# Patient Record
Sex: FEMALE | Race: ASIAN | Employment: UNEMPLOYED | URBAN - METROPOLITAN AREA
[De-identification: names, ages, dates, MRNs, and addresses within clinical notes are randomized per-mention and may not be internally consistent; named-entity substitution may affect disease eponyms.]

---

## 2024-04-25 ENCOUNTER — HOSPITAL ENCOUNTER (EMERGENCY)
Age: 49
Discharge: PSYCHIATRIC OR SUBSTANCE USE FACILITY | End: 2024-04-25
Attending: EMERGENCY MEDICINE
Payer: COMMERCIAL

## 2024-04-25 ENCOUNTER — HOSPITAL ENCOUNTER (INPATIENT)
Age: 49
LOS: 4 days | Discharge: HOME | DRG: 751 | End: 2024-04-29
Attending: PSYCHIATRY & NEUROLOGY | Admitting: PSYCHIATRY & NEUROLOGY
Payer: COMMERCIAL

## 2024-04-25 VITALS
RESPIRATION RATE: 18 BRPM | DIASTOLIC BLOOD PRESSURE: 63 MMHG | OXYGEN SATURATION: 99 % | HEART RATE: 73 BPM | WEIGHT: 131.2 LBS | HEIGHT: 61 IN | TEMPERATURE: 97.5 F | SYSTOLIC BLOOD PRESSURE: 104 MMHG | BODY MASS INDEX: 24.77 KG/M2

## 2024-04-25 DIAGNOSIS — F23 ACUTE PSYCHOSIS (HCC): Primary | ICD-10-CM

## 2024-04-25 PROBLEM — F29 PSYCHOSIS, UNSPECIFIED PSYCHOSIS TYPE (HCC): Status: ACTIVE | Noted: 2024-04-25

## 2024-04-25 LAB
ALBUMIN SERPL-MCNC: 4.5 G/DL (ref 3.4–5)
ALBUMIN/GLOB SERPL: 2 {RATIO}
ALP SERPL-CCNC: 75 U/L (ref 40–129)
ALT SERPL-CCNC: <5 U/L (ref 10–40)
AMPHET UR QL SCN: NEGATIVE
ANION GAP SERPL CALCULATED.3IONS-SCNC: 12 MMOL/L (ref 3–16)
APAP SERPL-MCNC: <5 UG/ML (ref 10–30)
AST SERPL-CCNC: 15 U/L (ref 15–37)
BACTERIA URNS QL MICRO: ABNORMAL
BARBITURATES UR QL SCN: NEGATIVE
BASOPHILS # BLD: 0.04 K/UL (ref 0–0.2)
BASOPHILS NFR BLD: 1 %
BENZODIAZ UR QL: NEGATIVE
BILIRUB SERPL-MCNC: 0.7 MG/DL (ref 0–1)
BILIRUB UR QL STRIP: ABNORMAL
BUN SERPL-MCNC: 14 MG/DL (ref 7–20)
CALCIUM SERPL-MCNC: 9.2 MG/DL (ref 8.3–10.6)
CANNABINOIDS UR QL SCN: NEGATIVE
CHARACTER UR: ABNORMAL
CHLORIDE SERPL-SCNC: 105 MMOL/L (ref 99–110)
CLARITY UR: CLEAR
CO2 SERPL-SCNC: 23 MMOL/L (ref 21–32)
COCAINE UR QL SCN: NEGATIVE
COLOR UR: YELLOW
CREAT SERPL-MCNC: 0.6 MG/DL (ref 0.5–1)
EOSINOPHIL # BLD: 0.02 K/UL (ref 0–0.6)
EOSINOPHILS RELATIVE PERCENT: 0 %
EPI CELLS #/AREA URNS HPF: ABNORMAL /HPF
ERYTHROCYTE [DISTWIDTH] IN BLOOD BY AUTOMATED COUNT: 12.4 % (ref 12.4–15.4)
ETHANOLAMINE SERPL-MCNC: NORMAL MG/DL
FENTANYL UR QL: NEGATIVE
FLUAV AG SPEC QL: NEGATIVE
FLUBV AG SPEC QL: NEGATIVE
GFR SERPL CREATININE-BSD FRML MDRD: >90 ML/MIN/1.73M2
GLUCOSE SERPL-MCNC: 129 MG/DL (ref 70–99)
GLUCOSE UR STRIP-MCNC: NEGATIVE MG/DL
HCG UR QL: NEGATIVE
HCT VFR BLD AUTO: 39.1 % (ref 36–48)
HGB BLD-MCNC: 13 G/DL (ref 12–16)
HGB UR QL STRIP.AUTO: NEGATIVE
IMM GRANULOCYTES # BLD AUTO: 0.01 K/UL (ref 0–0.5)
IMM GRANULOCYTES NFR BLD: 0 %
KETONES UR STRIP-MCNC: 40 MG/DL
LEUKOCYTE ESTERASE UR QL STRIP: ABNORMAL
LYMPHOCYTES NFR BLD: 0.83 K/UL (ref 1–5.1)
LYMPHOCYTES RELATIVE PERCENT: 13 %
MCH RBC QN AUTO: 29.7 PG (ref 26–34)
MCHC RBC AUTO-ENTMCNC: 33.2 G/DL (ref 31–36)
MCV RBC AUTO: 89.3 FL (ref 80–100)
METHADONE UR QL: NEGATIVE
MONOCYTES NFR BLD: 0.3 K/UL (ref 0–1.3)
MONOCYTES NFR BLD: 5 %
MUCOUS THREADS URNS QL MICRO: PRESENT
NEUTROPHILS NFR BLD: 81 %
NEUTS SEG NFR BLD: 5.07 K/UL (ref 1.7–7.7)
NITRITE UR QL STRIP: NEGATIVE
OPIATES UR QL SCN: NEGATIVE
OXYCODONE UR QL SCN: NEGATIVE
PCP UR QL SCN: NEGATIVE
PH UR STRIP: 5 [PH]
PH UR STRIP: 6 [PH] (ref 5–8)
PLATELET # BLD AUTO: 287 K/UL (ref 135–450)
PMV BLD AUTO: 10.8 FL (ref 9.4–12.4)
POTASSIUM SERPL-SCNC: 3.9 MMOL/L (ref 3.5–5.1)
PROT SERPL-MCNC: 6.7 G/DL (ref 6.4–8.2)
PROT UR STRIP-MCNC: NEGATIVE MG/DL
RBC # BLD AUTO: 4.38 M/UL (ref 4–5.2)
RBC #/AREA URNS HPF: ABNORMAL /HPF
SALICYLATES SERPL-MCNC: <0.5 MG/DL (ref 15–30)
SARS-COV-2 RDRP RESP QL NAA+PROBE: NOT DETECTED
SODIUM SERPL-SCNC: 139 MMOL/L (ref 136–145)
SP GR UR STRIP: >1.03 (ref 1–1.03)
SPECIMEN DESCRIPTION: NORMAL
TEST INFORMATION: NORMAL
TSH SERPL DL<=0.005 MIU/L-ACNC: 1.54 UIU/ML (ref 0.27–4.2)
UROBILINOGEN UR STRIP-ACNC: 1 EU/DL (ref 0–1)
WBC #/AREA URNS HPF: ABNORMAL /HPF
WBC OTHER # BLD: 6.3 K/UL (ref 4–11)

## 2024-04-25 PROCEDURE — 99285 EMERGENCY DEPT VISIT HI MDM: CPT

## 2024-04-25 PROCEDURE — 80061 LIPID PANEL: CPT

## 2024-04-25 PROCEDURE — G0480 DRUG TEST DEF 1-7 CLASSES: HCPCS

## 2024-04-25 PROCEDURE — 83036 HEMOGLOBIN GLYCOSYLATED A1C: CPT

## 2024-04-25 PROCEDURE — 36415 COLL VENOUS BLD VENIPUNCTURE: CPT

## 2024-04-25 PROCEDURE — 87635 SARS-COV-2 COVID-19 AMP PRB: CPT

## 2024-04-25 PROCEDURE — 80053 COMPREHEN METABOLIC PANEL: CPT

## 2024-04-25 PROCEDURE — 80307 DRUG TEST PRSMV CHEM ANLYZR: CPT

## 2024-04-25 PROCEDURE — 81001 URINALYSIS AUTO W/SCOPE: CPT

## 2024-04-25 PROCEDURE — 80143 DRUG ASSAY ACETAMINOPHEN: CPT

## 2024-04-25 PROCEDURE — 80179 DRUG ASSAY SALICYLATE: CPT

## 2024-04-25 PROCEDURE — 1240000000 HC EMOTIONAL WELLNESS R&B

## 2024-04-25 PROCEDURE — 84703 CHORIONIC GONADOTROPIN ASSAY: CPT

## 2024-04-25 PROCEDURE — 85025 COMPLETE CBC W/AUTO DIFF WBC: CPT

## 2024-04-25 PROCEDURE — 87086 URINE CULTURE/COLONY COUNT: CPT

## 2024-04-25 PROCEDURE — 84443 ASSAY THYROID STIM HORMONE: CPT

## 2024-04-25 PROCEDURE — 87804 INFLUENZA ASSAY W/OPTIC: CPT

## 2024-04-25 RX ORDER — ACETAMINOPHEN 325 MG/1
650 TABLET ORAL EVERY 4 HOURS PRN
Status: DISCONTINUED | OUTPATIENT
Start: 2024-04-25 | End: 2024-04-26

## 2024-04-25 RX ORDER — IBUPROFEN 400 MG/1
400 TABLET ORAL EVERY 6 HOURS PRN
Status: DISCONTINUED | OUTPATIENT
Start: 2024-04-25 | End: 2024-04-26

## 2024-04-25 RX ORDER — TRAZODONE HYDROCHLORIDE 50 MG/1
50 TABLET ORAL NIGHTLY PRN
Status: DISCONTINUED | OUTPATIENT
Start: 2024-04-25 | End: 2024-04-29 | Stop reason: HOSPADM

## 2024-04-25 RX ORDER — BENZTROPINE MESYLATE 1 MG/ML
2 INJECTION INTRAMUSCULAR; INTRAVENOUS 2 TIMES DAILY PRN
Status: DISCONTINUED | OUTPATIENT
Start: 2024-04-25 | End: 2024-04-26

## 2024-04-25 RX ORDER — POLYETHYLENE GLYCOL 3350 17 G
2 POWDER IN PACKET (EA) ORAL
Status: DISCONTINUED | OUTPATIENT
Start: 2024-04-25 | End: 2024-04-29 | Stop reason: HOSPADM

## 2024-04-25 RX ORDER — OLANZAPINE 10 MG/1
10 TABLET ORAL EVERY 4 HOURS PRN
Status: DISCONTINUED | OUTPATIENT
Start: 2024-04-25 | End: 2024-04-26

## 2024-04-25 RX ORDER — MAGNESIUM HYDROXIDE/ALUMINUM HYDROXICE/SIMETHICONE 120; 1200; 1200 MG/30ML; MG/30ML; MG/30ML
30 SUSPENSION ORAL EVERY 6 HOURS PRN
Status: DISCONTINUED | OUTPATIENT
Start: 2024-04-25 | End: 2024-04-29 | Stop reason: HOSPADM

## 2024-04-25 RX ORDER — NICOTINE 21 MG/24HR
1 PATCH, TRANSDERMAL 24 HOURS TRANSDERMAL DAILY
Status: DISCONTINUED | OUTPATIENT
Start: 2024-04-25 | End: 2024-04-26

## 2024-04-25 RX ORDER — HYDROXYZINE HYDROCHLORIDE 25 MG/1
25 TABLET, FILM COATED ORAL 3 TIMES DAILY PRN
Status: DISCONTINUED | OUTPATIENT
Start: 2024-04-25 | End: 2024-04-29 | Stop reason: HOSPADM

## 2024-04-25 ASSESSMENT — PATIENT HEALTH QUESTIONNAIRE - PHQ9
SUM OF ALL RESPONSES TO PHQ QUESTIONS 1-9: 6
10. IF YOU CHECKED OFF ANY PROBLEMS, HOW DIFFICULT HAVE THESE PROBLEMS MADE IT FOR YOU TO DO YOUR WORK, TAKE CARE OF THINGS AT HOME, OR GET ALONG WITH OTHER PEOPLE: SOMEWHAT DIFFICULT
SUM OF ALL RESPONSES TO PHQ QUESTIONS 1-9: 6
5. POOR APPETITE OR OVEREATING: NOT AT ALL
4. FEELING TIRED OR HAVING LITTLE ENERGY: SEVERAL DAYS
SUM OF ALL RESPONSES TO PHQ QUESTIONS 1-9: 6
SUM OF ALL RESPONSES TO PHQ9 QUESTIONS 1 & 2: 3
9. THOUGHTS THAT YOU WOULD BE BETTER OFF DEAD, OR OF HURTING YOURSELF: NOT AT ALL
3. TROUBLE FALLING OR STAYING ASLEEP: NOT AT ALL
8. MOVING OR SPEAKING SO SLOWLY THAT OTHER PEOPLE COULD HAVE NOTICED. OR THE OPPOSITE, BEING SO FIGETY OR RESTLESS THAT YOU HAVE BEEN MOVING AROUND A LOT MORE THAN USUAL: NOT AT ALL
SUM OF ALL RESPONSES TO PHQ QUESTIONS 1-9: 6
6. FEELING BAD ABOUT YOURSELF - OR THAT YOU ARE A FAILURE OR HAVE LET YOURSELF OR YOUR FAMILY DOWN: SEVERAL DAYS
1. LITTLE INTEREST OR PLEASURE IN DOING THINGS: NOT AT ALL
2. FEELING DOWN, DEPRESSED OR HOPELESS: NEARLY EVERY DAY
7. TROUBLE CONCENTRATING ON THINGS, SUCH AS READING THE NEWSPAPER OR WATCHING TELEVISION: SEVERAL DAYS

## 2024-04-25 ASSESSMENT — LIFESTYLE VARIABLES
HOW MANY STANDARD DRINKS CONTAINING ALCOHOL DO YOU HAVE ON A TYPICAL DAY: PATIENT DOES NOT DRINK
HOW OFTEN DO YOU HAVE A DRINK CONTAINING ALCOHOL: NEVER
HOW OFTEN DO YOU HAVE A DRINK CONTAINING ALCOHOL: MONTHLY OR LESS
HOW MANY STANDARD DRINKS CONTAINING ALCOHOL DO YOU HAVE ON A TYPICAL DAY: 1 OR 2

## 2024-04-25 ASSESSMENT — SLEEP AND FATIGUE QUESTIONNAIRES
DO YOU USE A SLEEP AID: NO
DO YOU HAVE DIFFICULTY SLEEPING: NO
AVERAGE NUMBER OF SLEEP HOURS: 7
SLEEP PATTERN: NORMAL

## 2024-04-25 ASSESSMENT — PAIN - FUNCTIONAL ASSESSMENT: PAIN_FUNCTIONAL_ASSESSMENT: NONE - DENIES PAIN

## 2024-04-25 NOTE — ED NOTES
Telehealth at bedside per Carolina's request. Carolina currently speaking with patient at this time. Pt denies needs. No acute distress noted. Pt remains calm and cooperative.

## 2024-04-25 NOTE — PROGRESS NOTES
Pt has arrived on unit at this time via security and I staff. She passed security check and remains calm and cooperative. Oriented to room and unit. Drink and snack offered. Pt denies current SI and agrees to communicate with staff if no longer feel safe or in control. V.s.s.

## 2024-04-25 NOTE — VIRTUAL HEALTH
Plan:  Collateral: ex-- Demetrice [phone]  Inpatient psychiatric admission at appropriate care level facility, once medically cleared and stable  Legal Status: INVOLUNTARY.  Patient is in active state of psychosis.  OK to d/c suicide and elopement precautions  Safety plan created and reviewed with patient, see below for details  Re-consult for any new changes or concerns. Thank you for this consult.  Discussed recommendations with Dr. Del Rosario at time of consult completion.    TelePsych recommendations:Inpatient psychiatric admission    Legal hold: Initiate Involuntary Hold    Safety Plan:  updated      Electronically signed by SHRUTHI Simpson on 4/25/2024 at 7:54 AM.       Leila Landry, was evaluated through a synchronous (real-time) audio-video encounter. The patient (and/or guardian if applicable) is aware that this is a billable service, which includes applicable co-pays. This virtual visit was conducted with patient's (and/or legal guardian's) consent. Patient identification was verified, and a caregiver was present when appropriate.  The patient was located at Facility [place]Appt Department[place] [facility] [place]  [address] [place] [address]  The provider was located at Home (City/State): [place], [place]  Confirm you are appropriately licensed, registered, or certified to deliver care in the state where the patient is located as indicated above. If you are not or unsure, please re-schedule the visit: Yes, I confirm.   [place] Consult to Tele-Psych  Consult performed by: Carolina Shannon LISW  Consult ordered by: Rafy Del Rosario, DO        Total time spent on this encounter: Not billed by time    --SHRUTHI Simpson on 4/25/2024 at 7:53 AM    An electronic signature was used to authenticate this note.

## 2024-04-25 NOTE — ED PROVIDER NOTES
Affect: Mood is depressed. Affect is flat.         Speech: Speech is delayed.         Behavior: Behavior is slowed. Behavior is cooperative.         Thought Content: Thought content does not include homicidal or suicidal ideation.        BRIEF DIFFERENTIAL DIAGNOSIS  1.  Bipolar  2.  Schizophrenia  3.  Depression  4.  Acute psychosis    INDEPENDENT EKG INTERPRETATION:  None    LABS  I have personally reviewed all labs for this visit.   Results for orders placed or performed during the hospital encounter of 04/25/24   Rapid influenza A/B antigens    Specimen: Nasopharyngeal   Result Value Ref Range    Flu A Antigen NEGATIVE NEGATIVE    Flu B Antigen NEGATIVE NEGATIVE   COVID-19, Rapid    Specimen: Nasopharyngeal Swab   Result Value Ref Range    Specimen Description .NASOPHARYNGEAL SWAB     SARS-CoV-2, Rapid Not Detected Not Detected   CBC with Auto Differential   Result Value Ref Range    WBC 6.3 4.0 - 11.0 k/uL    RBC 4.38 4.00 - 5.20 m/uL    Hemoglobin 13.0 12.0 - 16.0 g/dL    Hematocrit 39.1 36.0 - 48.0 %    MCV 89.3 80.0 - 100.0 fL    MCH 29.7 26.0 - 34.0 pg    MCHC 33.2 31.0 - 36.0 g/dL    RDW 12.4 12.4 - 15.4 %    Platelets 287 135 - 450 k/uL    MPV 10.8 9.4 - 12.4 fL    Neutrophils % 81 %    Lymphocytes % 13 %    Monocytes % 5 %    Eosinophils % 0 %    Basophils % 1 %    Immature Granulocytes % 0 0 %    Neutrophils Absolute 5.07 1.70 - 7.70 k/uL    Lymphocytes Absolute 0.83 (L) 1.00 - 5.10 k/uL    Monocytes Absolute 0.30 0.00 - 1.30 k/uL    Eosinophils Absolute 0.02 0.00 - 0.60 k/uL    Basophils Absolute 0.04 0.00 - 0.20 k/uL    Immature Granulocytes Absolute 0.01 0.00 - 0.50 k/uL   CMP w/ Reflex to MG   Result Value Ref Range    Sodium 139 136 - 145 mmol/L    Potassium 3.9 3.5 - 5.1 mmol/L    Chloride 105 99 - 110 mmol/L    CO2 23 21 - 32 mmol/L    Anion Gap 12 3 - 16 mmol/L    Glucose 129 (H) 70 - 99 mg/dL    BUN 14 7 - 20 mg/dL    Creatinine 0.6 0.5 - 1.0 mg/dL    Est, Glom Filt Rate >90 >60 mL/min/1.73m2

## 2024-04-25 NOTE — ED TRIAGE NOTES
Pt reports that she \"wants to check if her bipolar is ok\". Pt arrive by PD with reports of [place] \"[place] work\". Pt aaox4. Pt denies SI/HI. Pt states \"I am just very depressed\". Pt reports that she is going through a divorce and needs to find a job. Pt reports that she does not have a place to stay and inquired about local shelters. Pt is calm and cooperative at this time. Pt reports that she is depressed. Pt denies being on regular medications at this time.    Abdominal Pain, N/V/D

## 2024-04-25 NOTE — ED NOTES
All belongings collected and placed in plastic bags. Pt has purse, shoes, pants, tank top, and sweater. Glasses remain on patients face at this time. Pt placed in paper gown. Pt calm and cooperative. No acute distress noted

## 2024-04-26 PROBLEM — K64.8 OTHER HEMORRHOIDS: Status: ACTIVE | Noted: 2024-04-26

## 2024-04-26 LAB
CHOLEST SERPL-MCNC: 194 MG/DL (ref 0–199)
HDLC SERPL-MCNC: 68 MG/DL (ref 40–60)
LDLC SERPL CALC-MCNC: 116 MG/DL
MICROORGANISM SPEC CULT: NO GROWTH
SPECIMEN DESCRIPTION: NORMAL
TRIGL SERPL-MCNC: 49 MG/DL (ref 0–150)
VLDLC SERPL CALC-MCNC: 10 MG/DL

## 2024-04-26 PROCEDURE — 6370000000 HC RX 637 (ALT 250 FOR IP): Performed by: PSYCHIATRY & NEUROLOGY

## 2024-04-26 PROCEDURE — 99223 1ST HOSP IP/OBS HIGH 75: CPT | Performed by: PSYCHIATRY & NEUROLOGY

## 2024-04-26 PROCEDURE — 1240000000 HC EMOTIONAL WELLNESS R&B

## 2024-04-26 PROCEDURE — 99221 1ST HOSP IP/OBS SF/LOW 40: CPT | Performed by: NURSE PRACTITIONER

## 2024-04-26 RX ORDER — ARIPIPRAZOLE 10 MG/1
10 TABLET ORAL DAILY
Status: COMPLETED | OUTPATIENT
Start: 2024-04-26 | End: 2024-04-26

## 2024-04-26 RX ORDER — ARIPIPRAZOLE 15 MG/1
15 TABLET ORAL DAILY
Status: DISCONTINUED | OUTPATIENT
Start: 2024-04-27 | End: 2024-04-29 | Stop reason: HOSPADM

## 2024-04-26 RX ORDER — OLANZAPINE 5 MG/1
5 TABLET ORAL 3 TIMES DAILY PRN
Status: DISCONTINUED | OUTPATIENT
Start: 2024-04-26 | End: 2024-04-29 | Stop reason: HOSPADM

## 2024-04-26 RX ORDER — ACETAMINOPHEN 325 MG/1
650 TABLET ORAL EVERY 8 HOURS PRN
Status: DISCONTINUED | OUTPATIENT
Start: 2024-04-26 | End: 2024-04-29 | Stop reason: HOSPADM

## 2024-04-26 RX ORDER — HYDROCORTISONE ACETATE 25 MG/1
25 SUPPOSITORY RECTAL 2 TIMES DAILY PRN
Status: DISCONTINUED | OUTPATIENT
Start: 2024-04-26 | End: 2024-04-29 | Stop reason: HOSPADM

## 2024-04-26 RX ORDER — BENZOCAINE/MENTHOL 6 MG-10 MG
LOZENGE MUCOUS MEMBRANE 2 TIMES DAILY PRN
Status: DISCONTINUED | OUTPATIENT
Start: 2024-04-26 | End: 2024-04-29 | Stop reason: HOSPADM

## 2024-04-26 RX ADMIN — ARIPIPRAZOLE 10 MG: 10 TABLET ORAL at 14:11

## 2024-04-26 ASSESSMENT — SLEEP AND FATIGUE QUESTIONNAIRES
AVERAGE NUMBER OF SLEEP HOURS: 7
DO YOU USE A SLEEP AID: NO
SLEEP PATTERN: NORMAL
DO YOU HAVE DIFFICULTY SLEEPING: NO

## 2024-04-26 ASSESSMENT — PATIENT HEALTH QUESTIONNAIRE - PHQ9
9. THOUGHTS THAT YOU WOULD BE BETTER OFF DEAD, OR OF HURTING YOURSELF: NOT AT ALL
10. IF YOU CHECKED OFF ANY PROBLEMS, HOW DIFFICULT HAVE THESE PROBLEMS MADE IT FOR YOU TO DO YOUR WORK, TAKE CARE OF THINGS AT HOME, OR GET ALONG WITH OTHER PEOPLE: SOMEWHAT DIFFICULT
SUM OF ALL RESPONSES TO PHQ QUESTIONS 1-9: 6
SUM OF ALL RESPONSES TO PHQ QUESTIONS 1-9: 6
3. TROUBLE FALLING OR STAYING ASLEEP: NOT AT ALL
7. TROUBLE CONCENTRATING ON THINGS, SUCH AS READING THE NEWSPAPER OR WATCHING TELEVISION: SEVERAL DAYS
SUM OF ALL RESPONSES TO PHQ9 QUESTIONS 1 & 2: 3
SUM OF ALL RESPONSES TO PHQ QUESTIONS 1-9: 6
1. LITTLE INTEREST OR PLEASURE IN DOING THINGS: NOT AT ALL
8. MOVING OR SPEAKING SO SLOWLY THAT OTHER PEOPLE COULD HAVE NOTICED. OR THE OPPOSITE, BEING SO FIGETY OR RESTLESS THAT YOU HAVE BEEN MOVING AROUND A LOT MORE THAN USUAL: NOT AT ALL
5. POOR APPETITE OR OVEREATING: NOT AT ALL
2. FEELING DOWN, DEPRESSED OR HOPELESS: NEARLY EVERY DAY
6. FEELING BAD ABOUT YOURSELF - OR THAT YOU ARE A FAILURE OR HAVE LET YOURSELF OR YOUR FAMILY DOWN: SEVERAL DAYS
SUM OF ALL RESPONSES TO PHQ QUESTIONS 1-9: 6
4. FEELING TIRED OR HAVING LITTLE ENERGY: SEVERAL DAYS

## 2024-04-26 NOTE — PROGRESS NOTES
Behavioral Services  Medicare Certification Upon Admission    I certify that this patient's inpatient psychiatric hospital admission is medically necessary for:    [x] (1) Treatment which could reasonably be expected to improve this patient's condition,       [x] (2) Or for diagnostic study;     AND     [x](2) The inpatient psychiatric services are provided while the individual is under the care of a physician and are included in the individualized plan of care.    Estimated length of stay/service 4-6 days    Plan for post-hospital care incomplete     Electronically signed by XIN QUIÑONES MD on 4/26/2024 at 1:58 PM

## 2024-04-26 NOTE — CARE COORDINATION
Clinician met with the patient to conduct the psychosocial, CSSR lifetime assessment and the OQ analyst form. Patient was cooperative answering the questions.    Jolanta Macias, MSW    [phone]   Psychiatric History   Psychiatric history treatment Psychiatric admissions  (Pt reports being diagnosed with bipolar disorder 10 years ago after being hospitalized at [facility]. She reports being hospitalized at [facility] 4-5 times between 8-10 years ago and being prescribed Depakote and Haldol.)   Are there any medication issues? Yes  (She states she hasn't been on meds for several years (per ex-, the last 4 years).)   Recent Psychological Experiences Job loss;Financial;Divorce   Support System   Support system Other (Comment)  (Ex-: Demetrice- [phone]: reports they've been  for 6 years. Still supportive)   Problems in support system Lack of friends/family   Current Living Situation   Home Living Homeless   Living information Lives with others  (lived last 2 years in ex 's basement and cannot go back there.)   Problems with living situation  Yes   Relationship issues lived last 2 years in ex 's basement and cannot go back there.   Financial problems jobless   Lack of basic needs Yes   Lacking basic needs Medical;Shelter   SSDI/SSI none   Other government assistance none   Problems with environment homelessness   Current abuse issues none   Supervised setting None   Relationship problems No   Medical and Self-Care Issues   Relevant medical problems history of Bipolar affective disorder (HCC)   Relevant self-care issues none   Barriers to treatment Yes  (homelessness and no transportation, not working)   Family [employer]   Spouse/partner-name/age Ex-: Sumeet [phone]: reports they've been  for 6 years.   Children-names/ages 4 adult children   Parents mom  and dad lives in [place]   Siblings 1 sister   Support services   (n/a)   Childhood   Raised by

## 2024-04-26 NOTE — GROUP NOTE
Group Therapy Note    Date: 4/26/2024    Group Start Time: 1100  Group End Time: 1145  Group Topic: Psychoeducation    [facility]    Sameera Gant, RT        Group Therapy Note    Attendees: 9    Topic: Locus of Control    Patients participated in \"[place] of control\" activity to practice identifying the current factors and stressors that they are able to influence.  Participants identified which aspects they have no control, some control, and most control over.      Notes:  Leila was present and attentive across session.  Active listener during discussion and appropriately interactive with peers.  Took notes throughout entire group and asked questions pertaining to group material.  Expressed interest in learning information and was receptive to intervention.  Met goal for group.    Status After Intervention:  Improved    Participation Level: Active Listener and Interactive    Participation Quality: Appropriate, Attentive, Sharing, and Supportive      Speech:  normal      Thought Process/Content: Logical      Affective Functioning: Congruent      Mood: euthymic      Level of consciousness:  Alert and Attentive      Response to Learning: Able to verbalize current knowledge/experience, Able to verbalize/acknowledge new learning, Capable of insight, and Progressing to goal      Endings: None Reported    Modes of Intervention: Education, Support, Socialization, Exploration, Clarifying, Problem-solving, and Activity      Discipline Responsible: Psychoeducational Specialist and Recreational Therapist      Signature:  Sameera Gant MA, CTRS

## 2024-04-26 NOTE — PROGRESS NOTES
[facility]  Treatment Team Note  Review Date & Time: 4/26/24  0484    Patient was not in treatment team      Status EXAM:   Mental Status and Behavioral Exam  Normal: No  Level of Assistance: Independent/Self  Facial Expression: Flat, Worried  Affect: Blunt  Level of Consciousness: Alert  Frequency of Checks: 4 times per hour, close  Mood:Normal: No  Mood: Anxious, Depressed  Motor Activity:Normal: No  Motor Activity: Decreased  Eye Contact: Fair  Observed Behavior: Cooperative, Friendly  Sexual Misconduct History: Current - no  Preception: [place] to person, [place] to time, [place] to place, [place] to situation  Attention:Normal: No  Attention: Distractible  Thought Processes: Circumstantial  Thought Content:Normal: Yes  Thought Content: Poverty of content  Depression Symptoms: Feelings of helplessness, Feelings of hopelessess, Loss of interest, Isolative  Anxiety Symptoms: Generalized  Kesha Symptoms: Poor judgment  Hallucinations: None (no current hallucinations.)  Delusions: No  Memory:Normal: Yes  Memory: Poor recent  Insight and Judgment: No  Insight and Judgment: Poor judgment, Poor insight      Suicide Risk CSSR-S:  1) Within the past month, have you wished you were dead or wished you could go to sleep and not wake up? : No  2) Have you actually had any thoughts of killing yourself? : No  3) Have you been thinking about how you might kill yourself? : No  5) Have you started to work out or worked out the details of how to kill yourself? Do you intend to carry out this plan? : No  6) Have you ever done anything, started to do anything, or prepared to do anything to end your life?: No      PLAN/TREATMENT RECOMMENDATIONS UPDATE: Patient will take medication as prescribed, eat 75% of meals, attend groups, participate in milieu activities, participate in treatment team and care planning for discharge and follow up.           Jeyson Han RN

## 2024-04-26 NOTE — GROUP NOTE
Group Therapy Note    Date: 4/26/2024    Group Start Time: 1000  Group End Time: 1045  Group Topic: Psychoeducation    [facility]    Marcello Aguilar        Group Therapy Note      Topic: Categories of Assertiveness    Group facilitator led discussion of three types of assertiveness: refusal, expressive, requesting. Allowed semi-structured process for patients to collaborate in exploration of behaviors, verbal/paraverbal/nonverbal influences on each.    Attendees: 8         Notes:  Leila was present across session, took notes throughout. Asked clarifying questions to peers. Appropriately attentive and engagement across duration. Met goal for group.    Status After Intervention:  Improved    Participation Level: Active Listener and Interactive    Participation Quality: Appropriate, Attentive, and Sharing      Speech:  normal      Thought Process/Content: Logical      Affective Functioning: Congruent      Mood: euthymic      Level of consciousness:  Alert and Attentive      Response to Learning: Capable of insight and Progressing to goal      Endings: None Reported    Modes of Intervention: Education, Support, Socialization, Exploration, Clarifying, and Problem-solving      Discipline Responsible: Psychoeducational Specialist      Signature:  Marcello Aguilar MM, MT-BC

## 2024-04-27 LAB
EST. AVERAGE GLUCOSE BLD GHB EST-MCNC: 105.4 MG/DL
HBA1C MFR BLD: 5.3 %

## 2024-04-27 PROCEDURE — 6370000000 HC RX 637 (ALT 250 FOR IP): Performed by: PSYCHIATRY & NEUROLOGY

## 2024-04-27 PROCEDURE — 1240000000 HC EMOTIONAL WELLNESS R&B

## 2024-04-27 RX ADMIN — ARIPIPRAZOLE 15 MG: 15 TABLET ORAL at 09:30

## 2024-04-27 RX ADMIN — HYDROCORTISONE: 1 CREAM TOPICAL at 15:23

## 2024-04-27 ASSESSMENT — PAIN SCALES - GENERAL: PAINLEVEL_OUTOF10: 0

## 2024-04-28 PROCEDURE — 99233 SBSQ HOSP IP/OBS HIGH 50: CPT | Performed by: PSYCHIATRY & NEUROLOGY

## 2024-04-28 PROCEDURE — 6370000000 HC RX 637 (ALT 250 FOR IP): Performed by: PSYCHIATRY & NEUROLOGY

## 2024-04-28 PROCEDURE — 1240000000 HC EMOTIONAL WELLNESS R&B

## 2024-04-28 RX ADMIN — ARIPIPRAZOLE 15 MG: 15 TABLET ORAL at 08:50

## 2024-04-28 RX ADMIN — HYDROCORTISONE: 1 CREAM TOPICAL at 08:50

## 2024-04-28 ASSESSMENT — PAIN SCALES - GENERAL: PAINLEVEL_OUTOF10: 0

## 2024-04-29 VITALS
DIASTOLIC BLOOD PRESSURE: 79 MMHG | WEIGHT: 130 LBS | TEMPERATURE: 98 F | OXYGEN SATURATION: 100 % | SYSTOLIC BLOOD PRESSURE: 118 MMHG | HEIGHT: 61 IN | BODY MASS INDEX: 24.55 KG/M2 | RESPIRATION RATE: 16 BRPM | HEART RATE: 86 BPM

## 2024-04-29 PROCEDURE — 6370000000 HC RX 637 (ALT 250 FOR IP): Performed by: PSYCHIATRY & NEUROLOGY

## 2024-04-29 PROCEDURE — 5130000000 HC BRIDGE APPOINTMENT

## 2024-04-29 RX ORDER — ARIPIPRAZOLE 15 MG/1
15 TABLET ORAL DAILY
Qty: 30 TABLET | Refills: 0 | Status: SHIPPED | OUTPATIENT
Start: 2024-04-30

## 2024-04-29 RX ADMIN — ARIPIPRAZOLE 15 MG: 15 TABLET ORAL at 08:47

## 2024-04-29 NOTE — PLAN OF CARE
Problem: Anxiety  Goal: Will report anxiety at manageable levels  Description: INTERVENTIONS:  1. Administer medication as ordered  2. Teach and rehearse alternative coping skills  3. Provide emotional support with 1:1 interaction with staff  4/26/2024 1239 by Kenyatta Leach, RN  Outcome: Progressing     PT. Denies all. Isolative. Ax4. Pleasant and cooperative.   
  Problem: Anxiety  Goal: Will report anxiety at manageable levels  Description: INTERVENTIONS:  1. Administer medication as ordered  2. Teach and rehearse alternative coping skills  3. Provide emotional support with 1:1 interaction with staff  4/27/2024 1359 by Jolanta Rueda RN  Outcome: Not Progressing  Flowsheets (Taken 4/27/2024 1343)  Will report anxiety at manageable levels: Provide emotional support with 1:1 interaction with staff  4/27/2024 0029 by Jing Cortes RN  Outcome: Progressing     Problem: Coping  Goal: Pt/Family able to verbalize concerns and demonstrate effective coping strategies  Description: INTERVENTIONS:  1. Assist patient/family to identify coping skills, available support systems and cultural and spiritual values  2. Provide emotional support, including active listening and acknowledgement of concerns of patient and caregivers  3. Reduce environmental stimuli, as able  4. Instruct patient/family in relaxation techniques, as appropriate  5. Assess for spiritual pain/suffering and initiate Spiritual Care, Psychosocial Clinical Specialist consults as needed  4/27/2024 1359 by Jolanta Rueda RN  Outcome: Not Progressing  4/27/2024 0029 by Jing Cortes RN  Outcome: Progressing     Problem: Decision Making  Goal: Pt/Family able to effectively weigh alternatives and participate in decision making related to treatment and care  Description: INTERVENTIONS:  1. Determine when there are differences between patient's view, family's view, and healthcare provider's view of condition  2. Facilitate patient and family articulation of goals for care  3. Help patient and family identify pros/cons of alternative solutions  4. Provide information as requested by patient/family  5. Respect patient/family right to receive or not to receive information  6. Serve as a liaison between patient and family and health care team  7. Initiate Consults from Ethics, Palliative Care or initiate Family 
  Problem: Anxiety  Goal: Will report anxiety at manageable levels  Description: INTERVENTIONS:  1. Administer medication as ordered  2. Teach and rehearse alternative coping skills  3. Provide emotional support with 1:1 interaction with staff  Outcome: Not Progressing     Problem: Depression/Self Harm  Goal: Effect of psychiatric condition will be minimized and patient will be protected from self harm  Description: INTERVENTIONS:  1. Assess impact of patient's symptoms on level of functioning, self care needs and offer support as indicated  2. Assess patient/family knowledge of depression, impact on illness and need for teaching  3. Provide emotional support, presence and reassurance  4. Assess for possible suicidal thoughts or ideation. If patient expresses suicidal thoughts or statements do not leave alone, initiate Suicide Precautions, move to a room close to the nursing station and obtain sitter  5. Initiate consults as appropriate with Mental Health Professional, Spiritual Care, Psychosocial CNS, and consider a recommendation to the LIP for a Psychiatric Consultation  Outcome: Not Progressing     Problem: Psychosis  Goal: Will report no hallucinations or delusions  Description: INTERVENTIONS:  1. Administer medication as  ordered  2. Assist with reality testing to support increasing orientation  3. Assess if patient's hallucinations or delusions are encouraging self harm or harm to others and intervene as appropriate  Outcome: Not Progressing   Pt is a very kind, pleasant person. She denies any SI but does see, depressed. Slept quite a while early in the evening and awoke briefly then returned to sleep. Pt was not tearful but did seem quite sad. Pt does not seem like she is coping very well at this time. She also continues to have at least VH which she says do frighten her at times.  
  Problem: Anxiety  Goal: Will report anxiety at manageable levels  Description: INTERVENTIONS:  1. Administer medication as ordered  2. Teach and rehearse alternative coping skills  3. Provide emotional support with 1:1 interaction with staff  Outcome: Progressing     Problem: Coping  Goal: Pt/Family able to verbalize concerns and demonstrate effective coping strategies  Description: INTERVENTIONS:  1. Assist patient/family to identify coping skills, available support systems and cultural and spiritual values  2. Provide emotional support, including active listening and acknowledgement of concerns of patient and caregivers  3. Reduce environmental stimuli, as able  4. Instruct patient/family in relaxation techniques, as appropriate  5. Assess for spiritual pain/suffering and initiate Spiritual Care, Psychosocial Clinical Specialist consults as needed  Outcome: Progressing     Problem: Decision Making  Goal: Pt/Family able to effectively weigh alternatives and participate in decision making related to treatment and care  Description: INTERVENTIONS:  1. Determine when there are differences between patient's view, family's view, and healthcare provider's view of condition  2. Facilitate patient and family articulation of goals for care  3. Help patient and family identify pros/cons of alternative solutions  4. Provide information as requested by patient/family  5. Respect patient/family right to receive or not to receive information  6. Serve as a liaison between patient and family and health care team  7. Initiate Consults from Ethics, Palliative Care or initiate Family Care Conference as is appropriate  Outcome: Progressing     Problem: Behavior  Goal: Pt/Family maintain appropriate behavior and adhere to behavioral management agreement, if implemented  Description: INTERVENTIONS:  1. Assess patient/family's coping skills and  non-compliant behavior (including use of illegal substances)  2. Notify security of 
Leila has been social with select peers and attended group this shift.  Leila is pleasant on approach and denies current SI/HI & A/VH.   Problem: Anxiety  Goal: Will report anxiety at manageable levels  Description: INTERVENTIONS:  1. Administer medication as ordered  2. Teach and rehearse alternative coping skills  3. Provide emotional support with 1:1 interaction with staff  4/27/2024 0029 by Jing Cortes RN  Outcome: Progressing     Problem: Coping  Goal: Pt/Family able to verbalize concerns and demonstrate effective coping strategies  Description: INTERVENTIONS:  1. Assist patient/family to identify coping skills, available support systems and cultural and spiritual values  2. Provide emotional support, including active listening and acknowledgement of concerns of patient and caregivers  3. Reduce environmental stimuli, as able  4. Instruct patient/family in relaxation techniques, as appropriate  5. Assess for spiritual pain/suffering and initiate Spiritual Care, Psychosocial Clinical Specialist consults as needed  Outcome: Progressing     Problem: Psychosis  Goal: Will report no hallucinations or delusions  Description: INTERVENTIONS:  1. Administer medication as  ordered  2. Assist with reality testing to support increasing orientation  3. Assess if patient's hallucinations or delusions are encouraging self harm or harm to others and intervene as appropriate  Outcome: Progressing     
Pt A&O, visible on unit denies SI,HI,AVH, no distress noted calm and cooperative with care.   
Pt calm and cooperative with care, out in dayroom doing origami, pt complies with care denies SI,HI,AVH, states she feels safe here and will confide in staff if she has thoughts of self harm.    
Pt has been visible on the unit and attended wrap up group. Pt has been calm, cooperative, and friendly. Pt appears brightened and gave staff different crafts she made (origami and colored pictures). Pt states she feels good and is looking forward to being discharged tomorrow. Pt reports she has a good support system within her [employer] friends. Pt denies SI/HI/AVH.         Problem: Anxiety  Goal: Will report anxiety at manageable levels  Description: INTERVENTIONS:  1. Administer medication as ordered  2. Teach and rehearse alternative coping skills  3. Provide emotional support with 1:1 interaction with staff  Outcome: Progressing     Problem: Coping  Goal: Pt/Family able to verbalize concerns and demonstrate effective coping strategies  Description: INTERVENTIONS:  1. Assist patient/family to identify coping skills, available support systems and cultural and spiritual values  2. Provide emotional support, including active listening and acknowledgement of concerns of patient and caregivers  3. Reduce environmental stimuli, as able  4. Instruct patient/family in relaxation techniques, as appropriate  5. Assess for spiritual pain/suffering and initiate Spiritual Care, Psychosocial Clinical Specialist consults as needed  Outcome: Progressing     Problem: Decision Making  Goal: Pt/Family able to effectively weigh alternatives and participate in decision making related to treatment and care  Description: INTERVENTIONS:  1. Determine when there are differences between patient's view, family's view, and healthcare provider's view of condition  2. Facilitate patient and family articulation of goals for care  3. Help patient and family identify pros/cons of alternative solutions  4. Provide information as requested by patient/family  5. Respect patient/family right to receive or not to receive information  6. Serve as a liaison between patient and family and health care team  7. Initiate Consults from Ethics, Palliative Care 
harm to others and intervene as appropriate  Outcome: Progressing

## 2024-04-29 NOTE — TRANSITION OF CARE
Behavioral Health Transition Record to Provider    Patient Name: Leila Landry  YOB: 1975   Medical Record Number: [id number]  Date of Admission: 4/25/2024  1:11 PM   Date of Discharge: 4/29/2024    Attending Provider: Marcello Bear MD   Discharging Provider: Marcello Bear MD   To contact this individual call [phone] and ask the  to page.  If unavailable, ask to be transferred to Behavioral Health Provider on call.  A Behavioral Health Provider will be available on call 24/7 and during holidays.    Primary Care Provider: No primary care provider on file.    No Known Allergies    Reason for Admission: Leila Landry is a 48 y.o. female who presents to the ED for evaluation of psychiatric issues.  Patient has a known history of bipolar disease admitted occasion.  She does not even remember what she supposed to be taking.  Patient was at a gas station this morning asking about work the police were called.  She seemed a bit off to the police and they brought her here for evaluation.  Patient initially states she is not hearing voices.  States she just feels very depressed.  She is currently going through a divorce which is what is triggering it.  States she has heard voices in the past but not currently.  Denies feeling suicidal or homicidal.  States she is trying to also find a place to live and work due to her divorce.     Admission Diagnosis: Acute psychosis (HCC) [F23]  Psychosis, unspecified psychosis type (HCC) [F29]    If you experience a mental health emergency please call The National Suicide and Crisis Hotline Number is 988  You can call, chat, or text this number at any time to access emergency mental health services. General acute hospital is [phone] ([phone]). You can use this number at any time to access emergency mental health services.     Please note that we have a patient family advisory [place] that meets the second Wednesday of January and the second Wednesday of July

## 2024-04-29 NOTE — PROGRESS NOTES
Bridge Appointment completed: Reviewed Discharge Instructions with patient.    Patient verbalizes understanding and agreement with the discharge plan using the teachback method.       Vaccinations (marcia X if applicable and completed):  ( ) Patient states already received influenza vaccine elsewhere  ( ) Patient received influenza vaccine during this hospitalization  ( ) Patient refused influenza vaccine at this time  ( x) Not offered

## 2024-04-29 NOTE — PROGRESS NOTES
Behavioral Health [place]  Discharge Note    Pt discharged with followings belongings:   Dental Appliances: None  Vision - Corrective Lenses: Eyeglasses  Hearing Aid: None  Jewelry: None  Body Piercings Removed: No  Clothing: Pants, Shirt, Footwear (2 shirts and 1 pair of pants)  Other Valuables: Purse (with mutiple different papers and books.)   Valuables returned to patient. Patient educated on aftercare instructions: yes. Patient verbalize understanding of AVS:  yes.    Status EXAM upon discharge:  Mental Status and Behavioral Exam  Normal: No  Level of Assistance: Independent/Self  Facial Expression: Brightened  Affect: Congruent  Level of Consciousness: Alert  Frequency of Checks: 4 times per hour, close  Mood:Normal: Yes  Mood: Anxious  Motor Activity:Normal: Yes  Motor Activity: Decreased  Eye Contact: Good  Observed Behavior: Friendly, Cooperative  Sexual Misconduct History: Current - no  Preception: [place] to person, [place] to time, [place] to place, [place] to situation  Attention:Normal: Yes  Attention: Distractible  Thought Processes: Unremarkable  Thought Content:Normal: Yes  Thought Content: Poverty of content  Depression Symptoms: No problems reported or observed.  Anxiety Symptoms: No problems reported or observed.  Kesha Symptoms: No problems reported or observed.  Hallucinations: None  Delusions: No  Memory:Normal: Yes  Memory: Poor recent  Insight and Judgment: Yes  Insight and Judgment: Poor judgment, Poor insight    Tobacco Screening:  Practical Counseling, on admission, marcia X, if applicable and completed (first 3 are required if patient doesn't refuse):            ( ) Recognizing danger situations (included triggers and roadblocks)                    ( ) Coping skills (new ways to manage stress,relaxation techniques, changing routine, distraction)                                                           ( ) Basic information about quitting (benefits of quitting, techniques in how to quit,

## 2024-04-29 NOTE — PROGRESS NOTES
Group Therapy Note    Date: 4/28/2024  Start Time: 20:00  End Time:  21:00  Number of Participants: 8    Type of Group: Recreational  wrap up    Wellness Binder Information  Module Name:  /  Session Number:  /    Patient's Goal:  coping skills    Notes:  continuing to work on goal    Status After Intervention:  Unchanged    Participation Level: Active Listener and Interactive    Participation Quality: Appropriate, Attentive, and Sharing      Speech:  pressured      Thought Process/Content: Logical      Affective Functioning: Blunted      Mood: anxious      Level of consciousness:  Alert and Attentive      Response to Learning: Able to change behavior      Endings: None Reported    Modes of Intervention: Socialization and Problem-solving      Discipline Responsible: Behavorial Health Tech      Signature:  Juan Carlos Inman

## 2024-04-29 NOTE — BH NOTE
Home Medication Reconciliation Status          [x] COMPLETE       Medication history has been reviewed and obtained from the following source(s):       [x] patient/family verbal report             [] patient/family provided written list       [x] external pharmacy   [] external facility list         []  Provider notified that home medication reconciliation is complete          [] IN PROGRESS       Medication reconciliation marked in progress at this time due to:       [] patient/family poor historian      [] waiting arrival of family to clarify       [] waiting for accurate list        [] external pharmacy needs called      * Follow up is needed.          [] UNABLE TO ASSESS       Medication reconciliation is incomplete and unable to assess at this time due to:       [] critical patient condition   [] patient is unresponsive        [] no family available                       [] unknown pharmacy       [] anonymous patient          * Follow up is needed.      [] Pharmacy consult placed for medication reconciliation assistance   Additional comments:  Pt states she does not take any medication. Per pharmacy Pt has Celexa 500 mg ready for  but has not picked it up and no previous Celexa filled or picked up.   
4 Eyes Skin Assessment     NAME:  Leila Landry  YOB: 1975  MEDICAL RECORD NUMBER:  [id number]    The patient is being assessed for  Admission    I agree that at least one RN has performed a thorough Head to Toe Skin Assessment on the patient. ALL assessment sites listed below have been assessed.      Areas assessed by both nurses:    Head, Face, Ears, Shoulders, Back, Chest, Arms, Elbows, Hands, Sacrum. Buttock, Coccyx, Ischium, and Legs. Feet and Heels        Does the Patient have a Wound? No noted wound(s)       Eder Prevention initiated by RN: No  Wound Care Orders initiated by RN: No    Pressure Injury (Stage 3,4, Unstageable, DTI, NWPT, and Complex wounds) if present, place Wound referral order by RN under : No    New Ostomies, if present place, Ostomy referral order under : No     Nurse 1 eSignature: Electronically signed by Diane Soto RN on 4/25/24 at 2:43 PM EDT    **SHARE this note so that the co-signing nurse can place an eSignature**    Nurse 2 eSignature: {Esignature:[id number]}   
Clinician called the patient's Medicaid and set up transportation home.   
Clinician discussed he discharge plans during the intake process. Patient stated she is going back home to her ex husbands basement and clinician asked if she would sign a LIDA to talk to him about her discharge plans as it was noted in the file that he did not want her to come back. Patient stated she wanted to talk to him and did not want to sign a release. Clinician let her know that she will be back on Monday and they will resume the discharge planning at that time.   
     ( )  Coping skills (new ways to manage stress, exercise, relaxation techniques, changing routine, distraction)                                                           ( )  Basic information about quitting (benefits of quitting, techniques in how to quit, available resources  ( ) [facility]                                           ( ) Patient refused counseling  ( ) Patient has not smoked in the last 30 days    Metabolic Screening:    No results found for: \"LABA1C\"    No results for input(s): \"CHOL\", \"TRIG\", \"HDL\", \"LDLCALC\" in the last 72 hours.    Invalid input(s): \"LABVLDL\"    Body mass index is 24.23 kg/m².    BP Readings from Last 2 Encounters:   04/25/24 122/68   04/25/24 104/63           Pt admitted with followings belongings:  Dental Appliances: None  Vision - Corrective Lenses: Eyeglasses  Hearing Aid: None  Jewelry: None  Body Piercings Removed: No  Clothing: Pants, Shirt, Footwear (2 shirts and 1 pair of pants)  Other Valuables: Purse (with mutiple different papers and books.)     Valuables placed in safe in security envelope, number:   Patient oriented to surroundings and program expectations and copy of patient rights given. Received admission packet:  Yes.  Consents reviewed, signed Yes. Patient verbalize understanding:  Yes.    Patient education on precautions: Yes                   Diane Soto RN

## 2024-04-29 NOTE — PROGRESS NOTES
Department of Psychiatry  Progress Note    Patient's chart was reviewed. Discussed with treatment team. Met with patient.     SUBJECTIVE:      More organized today.    Active in the milieu.    Tolerating Abilify well so far.     Says if she can't return to her  house, she'll live in a shelter.     No thoughts of self-harm or suicide.     ROS:   Patient has new complaints: no  Sleeping adequately:  Yes   Appetite adequate: Yes  Engaged in programming: Yes    OBJECTIVE:  VITALS:  /79   Pulse 86   Temp 98 °F (36.7 °C) (Oral)   Resp 16   Ht 1.56 m (5' 1.42\")   Wt 59 kg (130 lb)   LMP 04/15/2024 (Approximate)   SpO2 100%   BMI 24.23 kg/m²     Mental Status Examination:    Appearance: fair grooming and hygiene  Behavior/Attitude toward examiner:  cooperative, attentive and fair eye contact  Speech: Normal rate, volume, amount  Mood:  \"better\"  Affect:  blunted     Thought processes:  more organized  Thought Content: no SI, no HI, no delusions voiced, no obsessions  Perceptions: no AVH  Attention: attention span and concentration were intact to interview   Abstraction: intact  Cognition:  Alert and oriented to person, place, time, and situation, recall intact  Insight: fair  Judgment: fair    Medication:  Scheduled:   ARIPiprazole  15 mg Oral Daily        PRN:  acetaminophen, OLANZapine **OR** [DISCONTINUED] OLANZapine (ZyPREXA) 10 mg in sterile water 2 mL injection, hydrocortisone, hydrocortisone, magnesium hydroxide, nicotine polacrilex, aluminum & magnesium hydroxide-simethicone, traZODone, hydrOXYzine HCl     FORMULATION: This is a temporarily domiciled, , and unemployed 48-year-old Chinese immigrant with a history of bipolar disorder, who was brought to [facility] Emergency Department by Police with psychotic behavior. She presents disorganized, elevated, likely delusional, and impaired. She requires inpatient stabilization.     Principal Problem:    Psychosis, unspecified psychosis

## 2024-04-29 NOTE — H&P
Hospital Medicine History & Physical      PCP: No primary care provider on file.    Date of Admission: 2024    Date of Service: Pt seen/examined on 24      Chief Complaint:  depression         History Of Present Illness:      The patient is a 48 y.o. female with PMH of anxiety, bipolar, depression who presented to [place] for depression.  Patient was seen and evaluated in the ED by the ED medical provider, patient was medically cleared for admission to [facility].  This note serves as an admission medical H&P.    Tobacco use: denies  ETOH use: denies  Illicit drug use: denies    Patient complains of hemorrhoid pain- ongoing for 20 years.     Past Medical History:        Diagnosis Date    Anxiety     Bipolar affective disorder (HCC)     Depression     External hemorrhoid        Past Surgical History:        Procedure Laterality Date     SECTION      x3       Medications Prior to Admission:    Prior to Admission medications    Not on File       Allergies:  Patient has no known allergies.        TOBACCO:   reports that she has never smoked. She has never used smokeless tobacco.  ETOH:   reports current alcohol use.      Family History:   Positive as follows:    History reviewed. No pertinent family history.    REVIEW OF SYSTEMS:       Constitutional: Negative for fever   HENT: Negative for sore throat   Eyes: Negative for redness   Respiratory: Negative  for dyspnea, cough   Cardiovascular: Negative for chest pain   Gastrointestinal: Negative for vomiting, diarrhea +hemorrhoids   Genitourinary: Negative for hematuria   Musculoskeletal: Negative for arthralgias   Skin: Negative for rash   Neurological: Negative for syncope    Hematological: Negative for easy bruising/bleeding   Psychiatric/Behavorial: Per psychiatry team evaluation     PHYSICAL EXAM:    /68   Pulse 81   Temp 97.5 °F (36.4 °C) (Axillary)   Resp 18   Ht 1.56 m (5' 1.42\")   Wt 59 kg (130 lb)   LMP 04/15/2024 (Approximate) 
elevated affect.  She had moderate psychomotor agitation.    She spoke in a normal volume, but was pressured.  She was oriented to the day, date, place, and the context of this evaluation.  Her memory was intact.    Her use of language, speech, and educational attainment suggested an average level of intellectual functioning.    Her thought processes were circumstantial.  In the emergency department, she was responding to internal stimuli and endorsed auditory hallucinations.  She was hyper [place].  She endorsed a history of suicidal statements, but denied being suicidal now.  She did not describe or endorse homicidal ideation.  She reported feeling safe here and willing to approach staff with concerns.    Her ability for abstract thought was limited based on her interpretation of simple proverbs.    Insight and judgment were impaired.    PHYSICAL EXAMINATION:  VITAL SIGNS:  Temperature 97.9, pulse 74, respiratory rate 17, blood pressure 104/64.  GAIT:  Normal.    LABORATORY DATA:  Shows a CMP with a glucose of 129, otherwise within normal limits.  TSH within normal limits.  Ethanol level not detectable.  Urine drug screen negative.  Acetaminophen and salicylate levels below threshold. CBC within normal limits.  Flu negative.  COVID negative.  UA, possible infection.    FORMULATION:  This is a temporarily domiciled, , and unemployed 48-year-old Chinese immigrant with a history of bipolar disorder, who was brought to [facility] Emergency Department by Police with psychotic behavior.  She presents disorganized, elevated, likely delusional, and impaired.  She requires inpatient stabilization.    DIAGNOSIS:  Psychosis, unspecified.    PLAN:    1. Admit to Psychiatry for stabilization and treatment.  2. Discussed treatment options at length.  She was reluctant to try another psychotropic medication, but eventually agreed to an Abilify trial.  Start 10 mg today and increase to 15 mg tomorrow.  Order

## 2024-04-30 ENCOUNTER — FOLLOWUP TELEPHONE ENCOUNTER (OUTPATIENT)
Dept: PSYCHIATRY | Age: 49
End: 2024-04-30

## 2024-05-07 ENCOUNTER — OFFICE VISIT (OUTPATIENT)
Dept: PRIMARY CARE CLINIC | Age: 49
End: 2024-05-07
Payer: COMMERCIAL

## 2024-05-07 VITALS
RESPIRATION RATE: 16 BRPM | HEIGHT: 61 IN | HEART RATE: 74 BPM | TEMPERATURE: 97.8 F | BODY MASS INDEX: 26.06 KG/M2 | DIASTOLIC BLOOD PRESSURE: 76 MMHG | SYSTOLIC BLOOD PRESSURE: 132 MMHG | WEIGHT: 138 LBS | OXYGEN SATURATION: 99 %

## 2024-05-07 DIAGNOSIS — Z00.00 ANNUAL PHYSICAL EXAM: Primary | ICD-10-CM

## 2024-05-07 DIAGNOSIS — Z12.11 SCREENING FOR COLON CANCER: ICD-10-CM

## 2024-05-07 DIAGNOSIS — L91.0 KELOID SCAR: ICD-10-CM

## 2024-05-07 DIAGNOSIS — K64.9 HEMORRHOIDS, UNSPECIFIED HEMORRHOID TYPE: ICD-10-CM

## 2024-05-07 DIAGNOSIS — F31.62 BIPOLAR DISORDER, CURRENT EPISODE MIXED, MODERATE (HCC): ICD-10-CM

## 2024-05-07 DIAGNOSIS — Z12.31 ENCOUNTER FOR SCREENING MAMMOGRAM FOR MALIGNANT NEOPLASM OF BREAST: ICD-10-CM

## 2024-05-07 PROBLEM — F31.9 BIPOLAR AFFECTIVE DISORDER, CURRENTLY ACTIVE (HCC): Status: ACTIVE | Noted: 2024-05-07

## 2024-05-07 PROCEDURE — 99386 PREV VISIT NEW AGE 40-64: CPT | Performed by: NURSE PRACTITIONER

## 2024-05-07 RX ORDER — HYDROCORTISONE 25 MG/G
CREAM TOPICAL
Qty: 28 G | Refills: 0 | Status: SHIPPED | OUTPATIENT
Start: 2024-05-07

## 2024-05-07 RX ORDER — MULTIVITAMIN
1 TABLET ORAL DAILY
Qty: 90 TABLET | Refills: 1 | Status: SHIPPED | OUTPATIENT
Start: 2024-05-07

## 2024-05-07 SDOH — ECONOMIC STABILITY: FOOD INSECURITY: WITHIN THE PAST 12 MONTHS, YOU WORRIED THAT YOUR FOOD WOULD RUN OUT BEFORE YOU GOT MONEY TO BUY MORE.: NEVER TRUE

## 2024-05-07 SDOH — ECONOMIC STABILITY: FOOD INSECURITY: WITHIN THE PAST 12 MONTHS, THE FOOD YOU BOUGHT JUST DIDN'T LAST AND YOU DIDN'T HAVE MONEY TO GET MORE.: NEVER TRUE

## 2024-05-07 SDOH — ECONOMIC STABILITY: HOUSING INSECURITY
IN THE LAST 12 MONTHS, WAS THERE A TIME WHEN YOU DID NOT HAVE A STEADY PLACE TO SLEEP OR SLEPT IN A SHELTER (INCLUDING NOW)?: NO

## 2024-05-07 SDOH — ECONOMIC STABILITY: INCOME INSECURITY: HOW HARD IS IT FOR YOU TO PAY FOR THE VERY BASICS LIKE FOOD, HOUSING, MEDICAL CARE, AND HEATING?: NOT HARD AT ALL

## 2024-05-07 ASSESSMENT — ENCOUNTER SYMPTOMS
WHEEZING: 0
CHEST TIGHTNESS: 0
VOMITING: 0
SHORTNESS OF BREATH: 0
COUGH: 0
ABDOMINAL PAIN: 0
CONSTIPATION: 0
BACK PAIN: 0
DIARRHEA: 0
NAUSEA: 0
SORE THROAT: 0
BLOOD IN STOOL: 0

## 2024-05-07 NOTE — PATIENT INSTRUCTIONS
MAY USE MEDERMA OR BIO OIL TO MASSAGE SCAR TWICE DAILY    I personally supervised this procedure performed by the resident and I agree with the above documentation.

## 2024-05-07 NOTE — PROGRESS NOTES
ENCOUNTER DATE: 5/7/2024     NAME: Leila Landry   AGE: 48 y.o.   GENDER: female   YOB: 1975    Chief Complaint   Patient presents with    Annual Exam    Establish Care    Other     Pt needs referral for obgyn         ASSESSMENT/PLAN:  1. Annual physical exam  Due for Pap.  Refer to GYN to establish  Due for mammogram.  Order entered.  Scheduling information given  Due for colon cancer screening.  Discussed options.  Patient would prefer to do Cologuard.  Order placed.  Pamphlet given  Recent fasting labs reviewed and discussed  - Kyaw Simental MD, Gynecology, [facility]    2. Encounter for screening mammogram for malignant neoplasm of breast  - LYUDMILA ANNA DIGITAL SCREEN BILATERAL; Future    3. Screening for colon cancer  - Fecal DNA Colorectal cancer screening (Cologuard)    4. Hemorrhoids, unspecified hemorrhoid type   No acute flare at this time.  Rectal exam deferred  May try Anusol as needed  Patient requests referral to colorectal specialist.  - hydrocortisone (ANUSOL-HC) 2.5 % CREA rectal cream; Apply to rectum every 12 hours PRN for hemorrhoids.  Dispense: 28 g; Refill: 0  - Fortunato Schneider MD, Colorectal Surgery, [facility]    5. Keloid scar  Discussed in detail.  Patient request referral to dermatology.  Referral placed  - [facility]    6. Bipolar disorder, current episode mixed, moderate (HCC)  Continue per psychiatry for medication management      No follow-ups on file.     HPI:  Leila Landry is here as a new patient to establish care and for a physical.  Previous PCP few years ago.     GYN  Doesn't have gyn.  Would like GYN referral  Unknown last pap  H/o IUD removal via umbilicus.     MAMMO  Due  No FH breast cancer    PSYCH  Recently inpatient for psychosis 4/25/24 x 5 days.   On [id number] daily  Following with psych at [facility]. Will follow up soon with psychiatry.   Doing better. Tries to remember to take meds